# Patient Record
Sex: MALE | URBAN - METROPOLITAN AREA
[De-identification: names, ages, dates, MRNs, and addresses within clinical notes are randomized per-mention and may not be internally consistent; named-entity substitution may affect disease eponyms.]

---

## 2019-08-29 ENCOUNTER — OFFICE VISIT (OUTPATIENT)
Dept: URBAN - METROPOLITAN AREA CLINIC 23 | Facility: CLINIC | Age: 44
End: 2019-08-29
Payer: COMMERCIAL

## 2019-08-29 DIAGNOSIS — H53.143 VISUAL DISCOMFORT, BILATERAL: Primary | ICD-10-CM

## 2019-08-29 PROCEDURE — 92002 INTRM OPH EXAM NEW PATIENT: CPT | Performed by: OPTOMETRIST

## 2019-08-29 ASSESSMENT — KERATOMETRY
OS: 42.25
OD: 42.50

## 2019-08-29 ASSESSMENT — INTRAOCULAR PRESSURE
OD: 11
OS: 10

## 2019-08-29 NOTE — IMPRESSION/PLAN
Impression: Visual discomfort, bilateral: H53.143. Plan: Discussed findings. No problems seen. Recommend pt use reading glasses or get refraction through [place] through insurance.